# Patient Record
Sex: FEMALE | Race: WHITE | NOT HISPANIC OR LATINO
[De-identification: names, ages, dates, MRNs, and addresses within clinical notes are randomized per-mention and may not be internally consistent; named-entity substitution may affect disease eponyms.]

---

## 2021-02-27 ENCOUNTER — TRANSCRIPTION ENCOUNTER (OUTPATIENT)
Age: 61
End: 2021-02-27

## 2021-10-07 ENCOUNTER — APPOINTMENT (OUTPATIENT)
Dept: CARDIOLOGY | Facility: CLINIC | Age: 61
End: 2021-10-07

## 2021-11-22 ENCOUNTER — NON-APPOINTMENT (OUTPATIENT)
Age: 61
End: 2021-11-22

## 2021-11-22 ENCOUNTER — APPOINTMENT (OUTPATIENT)
Dept: CARDIOLOGY | Facility: CLINIC | Age: 61
End: 2021-11-22
Payer: COMMERCIAL

## 2021-11-22 VITALS — SYSTOLIC BLOOD PRESSURE: 118 MMHG | DIASTOLIC BLOOD PRESSURE: 80 MMHG

## 2021-11-22 VITALS
OXYGEN SATURATION: 100 % | SYSTOLIC BLOOD PRESSURE: 122 MMHG | BODY MASS INDEX: 20.46 KG/M2 | HEART RATE: 56 BPM | HEIGHT: 68 IN | WEIGHT: 135 LBS | TEMPERATURE: 97.6 F | DIASTOLIC BLOOD PRESSURE: 80 MMHG | RESPIRATION RATE: 16 BRPM

## 2021-11-22 DIAGNOSIS — Z86.39 PERSONAL HISTORY OF OTHER ENDOCRINE, NUTRITIONAL AND METABOLIC DISEASE: ICD-10-CM

## 2021-11-22 DIAGNOSIS — Z87.891 PERSONAL HISTORY OF NICOTINE DEPENDENCE: ICD-10-CM

## 2021-11-22 DIAGNOSIS — R00.2 PALPITATIONS: ICD-10-CM

## 2021-11-22 DIAGNOSIS — Z82.49 FAMILY HISTORY OF ISCHEMIC HEART DISEASE AND OTHER DISEASES OF THE CIRCULATORY SYSTEM: ICD-10-CM

## 2021-11-22 DIAGNOSIS — R07.9 CHEST PAIN, UNSPECIFIED: ICD-10-CM

## 2021-11-22 DIAGNOSIS — G43.909 MIGRAINE, UNSPECIFIED, NOT INTRACTABLE, W/OUT STATUS MIGRAINOSUS: ICD-10-CM

## 2021-11-22 DIAGNOSIS — Z78.9 OTHER SPECIFIED HEALTH STATUS: ICD-10-CM

## 2021-11-22 DIAGNOSIS — D68.61 ANTIPHOSPHOLIPID SYNDROME: ICD-10-CM

## 2021-11-22 PROCEDURE — 99203 OFFICE O/P NEW LOW 30 MIN: CPT

## 2021-11-22 PROCEDURE — 93000 ELECTROCARDIOGRAM COMPLETE: CPT

## 2021-11-22 RX ORDER — ELETRIPTAN HYDROBROMIDE 40 MG/1
40 TABLET, FILM COATED ORAL
Refills: 0 | Status: ACTIVE | COMMUNITY

## 2021-11-25 PROBLEM — Z86.39 HISTORY OF HYPERLIPIDEMIA: Status: ACTIVE | Noted: 2021-11-25

## 2021-11-25 PROBLEM — Z86.39 HISTORY OF HYPERLIPIDEMIA: Status: RESOLVED | Noted: 2021-11-25 | Resolved: 2021-11-25

## 2021-11-25 PROBLEM — R07.9 CHEST PAIN: Status: ACTIVE | Noted: 2021-11-22

## 2021-11-25 PROBLEM — D68.61 ANTIPHOSPHOLIPID SYNDROME: Status: ACTIVE | Noted: 2021-11-22

## 2021-11-25 NOTE — HISTORY OF PRESENT ILLNESS
[FreeTextEntry1] : 62 yo female without hx of htn, HLD or diabetes\par She does not smoke. 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 She goes at 4.1 MPH, 25 minutes. She has no chest pain with activity.  \par A while ago while she was at work, sitting, she had arm and jaw pain, substernal chest discomfort lasted for a minute. She is not sure it is related to anxiety. No episode since then. She has other episodes in the past but not as long. \par No syncope or syncope.\par She feels fluttering in the chest that she had for many years, she wore a monitor.\par She has APA positive. She never had DVT or PE. \par \par \par Mom,  at 57 yo, she has lupus and lung cancer. She has encephalitis from radiation\par Dad  at age 73 with MI.\par \par Labs 11/3/21: Tc 226, , HDL 86

## 2021-11-25 NOTE — REVIEW OF SYSTEMS
[Palpitations] : palpitations [Weight Gain (___ Lbs)] : no recent weight gain [Feeling Fatigued] : not feeling fatigued [Weight Loss (___ Lbs)] : no recent weight loss [Blurry Vision] : no blurred vision [Seeing Double (Diplopia)] : no diplopia [Earache] : no earache [Discharge From Ears] : no discharge from the ears [SOB] : no shortness of breath [Dyspnea on exertion] : not dyspnea during exertion [Chest Discomfort] : no chest discomfort [Wheezing] : no wheezing [Coughing Up Blood] : no hemoptysis [Negative] : Heme/Lymph

## 2021-12-10 ENCOUNTER — APPOINTMENT (OUTPATIENT)
Dept: CARDIOLOGY | Facility: CLINIC | Age: 61
End: 2021-12-10

## 2021-12-20 ENCOUNTER — NON-APPOINTMENT (OUTPATIENT)
Age: 61
End: 2021-12-20

## 2021-12-21 ENCOUNTER — APPOINTMENT (OUTPATIENT)
Dept: CARDIOLOGY | Facility: CLINIC | Age: 61
End: 2021-12-21
Payer: COMMERCIAL

## 2021-12-21 PROCEDURE — 93015 CV STRESS TEST SUPVJ I&R: CPT

## 2021-12-21 PROCEDURE — 93306 TTE W/DOPPLER COMPLETE: CPT

## 2021-12-28 ENCOUNTER — NON-APPOINTMENT (OUTPATIENT)
Age: 61
End: 2021-12-28

## 2025-02-28 NOTE — ASSESSMENT
[FreeTextEntry1] : 61-year-old female with history of antiphospholipid antibody, untreated hyperlipidemia, presents with palpitations and episodes of chest discomfort last of which was about a few weeks ago and has not had any recurrence pain.\par She exercises frequently denies any cardiac symptoms.  She has had palpitations without syncope or presyncope.\par Advised her to wear a Holter monitor.  Advised her to decrease caffeinated foods and beverage intake.\par Patient with history of early miscarriage and was found to have antiphospholipid antibody but has never had any episode of clotting.  She is on aspirin based on the recommendation by her primary care physician.\par Due to chest pain, an echocardiogram and a stress test is ordered.  Patient knows that if she has any recurrent symptoms she should call 911 and go to the nearest emergency room for further evaluation.\par Lipid profile was reviewed with patient.  At this time she does not require any treatment.  Change of diet was discussed with patient.\par As long as asymptomatic she can follow-up with me once the above testing is complete.  Otherwise she will call to make an appointment earlier.\par 
Irregular